# Patient Record
(demographics unavailable — no encounter records)

---

## 2025-01-23 NOTE — DISCUSSION/SUMMARY
[de-identified] : Concern for chronic vs acute on chronic complete RTC tear- indicated for LT shoulder MRI to eval RTC tear, patient concerned of cost Rec course of PT/HEP-  he will pursue HEP patient requesting csi - Will hold off on csi inj due to acute nature of injury Diclofenac Rx F/up after 1 month of PT with MRI if no improvement.  ----------------------------------------------------------------------------  All relevant imaging studies pertinent to today's visit, including x-rays, MRI's and/or other advanced imaging studies (CT/etc) were independently interpreted and reviewed with the patient as needed. Implications of the studies together with the patient's clinical picture were discussed to formulate a working diagnosis and management options were detailed.   The patient and/or guardian was advised of the diagnosis. The natural history of the pathology was explained in full. All questions were answered. The risks and benefits of conservative and interventional treatment alternatives were explained to the patient  The patient and/or guardian was advised if any advanced diagnostic/imaging study (MRI/CT/etc) is ordered to evaluate potential pathology in the affected area(s), they should follow up in the office to review the results of the study and determine further management that may be indicated.

## 2025-01-23 NOTE — HISTORY OF PRESENT ILLNESS
[Sudden] : sudden [7] : 7 [3] : 3 [Sharp] : sharp [Frequent] : frequent [Nothing helps with pain getting better] : Nothing helps with pain getting better [de-identified] : This is Mr. KALLIE DYKES a 62 year old male who comes in today complaining of left shoulder pain since falling on the ice yesterday 1/22/25. Fell onto his back/ lateral shoulder. No relief with ibuprofen. Reports previous episode of pain 10+ years ago after a fall- did PT and had csi  inj- was doing very well and stable for years until this incident.   no dm/ no glaucoma  [] : no [FreeTextEntry3] : 1/22/25

## 2025-01-23 NOTE — IMAGING
[Left] : left shoulder [There are no fractures, subluxations or dislocations. No significant abnormalities are seen] : There are no fractures, subluxations or dislocations. No significant abnormalities are seen [Type 3 acromion] : Type 3 acromion [AC Joint Arthrosis] : AC Joint Arthrosis [de-identified] :   ----------------------------------------------------------------------------   Left shoulder exam:    Skin: no significant pertinent finding  Inspection: no obvious deformity, no obvious masses, no swelling, no effusion, no atrophy  ROM:     FF: 20a 170p     ER: 45     IR: L4  Tenderness:     (+) Anterior/Biceps:     (neg) Posterior     (+) Lateral     (neg) Trapezius     (neg) Scapula     (neg) AC joint     (neg) Crepitus with ROM  Stability:     (neg) Translation     (neg) Apprehension     (neg) Clicking  Additional tests:     (+) Neer's     (+) Hawkin's     (neg) Vanessa's     (neg) Speed     (neg) Cross chest adduction  Strength:     FF: 4+/5 after passively elevating arm, unable to initiate movement     ER: 3/5     IR: 5/5     Biceps: 5/5     Triceps: 5/5     Distal: 5/5  Neuro: In tact to light touch throughout  Vascularity: Extremity warm and well perfused   [FreeTextEntry1] : remodeling of greater tuberosity, mild narrowing of the acromiohumeral interval